# Patient Record
Sex: FEMALE | Race: WHITE | NOT HISPANIC OR LATINO | Employment: FULL TIME | URBAN - METROPOLITAN AREA
[De-identification: names, ages, dates, MRNs, and addresses within clinical notes are randomized per-mention and may not be internally consistent; named-entity substitution may affect disease eponyms.]

---

## 2022-12-29 ENCOUNTER — HOSPITAL ENCOUNTER (EMERGENCY)
Facility: HOSPITAL | Age: 27
Discharge: HOME | End: 2022-12-30
Attending: EMERGENCY MEDICINE
Payer: MEDICAID

## 2022-12-29 DIAGNOSIS — R55 SYNCOPE: ICD-10-CM

## 2022-12-29 DIAGNOSIS — I48.91 ATRIAL FIBRILLATION WITH RVR: Primary | ICD-10-CM

## 2022-12-29 DIAGNOSIS — M25.579 ANKLE PAIN: ICD-10-CM

## 2022-12-29 DIAGNOSIS — M79.673 FOOT PAIN: ICD-10-CM

## 2022-12-29 DIAGNOSIS — S93.409A SPRAIN OF ANKLE, UNSPECIFIED LATERALITY, UNSPECIFIED LIGAMENT, INITIAL ENCOUNTER: ICD-10-CM

## 2022-12-29 DIAGNOSIS — R00.0 TACHYCARDIA: ICD-10-CM

## 2022-12-29 DIAGNOSIS — I48.91 A-FIB: ICD-10-CM

## 2022-12-29 DIAGNOSIS — R55 SYNCOPE, UNSPECIFIED SYNCOPE TYPE: ICD-10-CM

## 2022-12-29 LAB
BASOPHILS # BLD AUTO: 0.05 K/UL (ref 0–0.2)
BASOPHILS NFR BLD: 0.3 % (ref 0–1.9)
BNP SERPL-MCNC: <10 PG/ML (ref 0–99)
DIFFERENTIAL METHOD: ABNORMAL
EOSINOPHIL # BLD AUTO: 0.1 K/UL (ref 0–0.5)
EOSINOPHIL NFR BLD: 0.9 % (ref 0–8)
ERYTHROCYTE [DISTWIDTH] IN BLOOD BY AUTOMATED COUNT: 14 % (ref 11.5–14.5)
HCT VFR BLD AUTO: 41.2 % (ref 37–48.5)
HGB BLD-MCNC: 13.1 G/DL (ref 12–16)
IMM GRANULOCYTES # BLD AUTO: 0.08 K/UL (ref 0–0.04)
IMM GRANULOCYTES NFR BLD AUTO: 0.5 % (ref 0–0.5)
LYMPHOCYTES # BLD AUTO: 4.9 K/UL (ref 1–4.8)
LYMPHOCYTES NFR BLD: 33.7 % (ref 18–48)
MCH RBC QN AUTO: 27.2 PG (ref 27–31)
MCHC RBC AUTO-ENTMCNC: 31.8 G/DL (ref 32–36)
MCV RBC AUTO: 86 FL (ref 82–98)
MONOCYTES # BLD AUTO: 0.8 K/UL (ref 0.3–1)
MONOCYTES NFR BLD: 5.2 % (ref 4–15)
NEUTROPHILS # BLD AUTO: 8.7 K/UL (ref 1.8–7.7)
NEUTROPHILS NFR BLD: 59.4 % (ref 38–73)
NRBC BLD-RTO: 0 /100 WBC
PLATELET # BLD AUTO: 300 K/UL (ref 150–450)
PMV BLD AUTO: 11.8 FL (ref 9.2–12.9)
RBC # BLD AUTO: 4.81 M/UL (ref 4–5.4)
TROPONIN I SERPL DL<=0.01 NG/ML-MCNC: <0.006 NG/ML (ref 0–0.03)
WBC # BLD AUTO: 14.68 K/UL (ref 3.9–12.7)

## 2022-12-29 PROCEDURE — 86803 HEPATITIS C AB TEST: CPT | Performed by: PHYSICIAN ASSISTANT

## 2022-12-29 PROCEDURE — 99285 EMERGENCY DEPT VISIT HI MDM: CPT | Mod: ,,, | Performed by: EMERGENCY MEDICINE

## 2022-12-29 PROCEDURE — 63600175 PHARM REV CODE 636 W HCPCS: Performed by: STUDENT IN AN ORGANIZED HEALTH CARE EDUCATION/TRAINING PROGRAM

## 2022-12-29 PROCEDURE — 93010 EKG 12-LEAD: ICD-10-PCS | Mod: 76,,, | Performed by: INTERNAL MEDICINE

## 2022-12-29 PROCEDURE — 84484 ASSAY OF TROPONIN QUANT: CPT | Performed by: EMERGENCY MEDICINE

## 2022-12-29 PROCEDURE — 87389 HIV-1 AG W/HIV-1&-2 AB AG IA: CPT | Performed by: PHYSICIAN ASSISTANT

## 2022-12-29 PROCEDURE — 96374 THER/PROPH/DIAG INJ IV PUSH: CPT

## 2022-12-29 PROCEDURE — 93005 ELECTROCARDIOGRAM TRACING: CPT

## 2022-12-29 PROCEDURE — 96375 TX/PRO/DX INJ NEW DRUG ADDON: CPT

## 2022-12-29 PROCEDURE — 94761 N-INVAS EAR/PLS OXIMETRY MLT: CPT

## 2022-12-29 PROCEDURE — 83880 ASSAY OF NATRIURETIC PEPTIDE: CPT | Performed by: EMERGENCY MEDICINE

## 2022-12-29 PROCEDURE — 93010 ELECTROCARDIOGRAM REPORT: CPT | Mod: 76,,, | Performed by: INTERNAL MEDICINE

## 2022-12-29 PROCEDURE — 85025 COMPLETE CBC W/AUTO DIFF WBC: CPT | Performed by: EMERGENCY MEDICINE

## 2022-12-29 PROCEDURE — 84443 ASSAY THYROID STIM HORMONE: CPT | Performed by: EMERGENCY MEDICINE

## 2022-12-29 PROCEDURE — 99285 PR EMERGENCY DEPT VISIT,LEVEL V: ICD-10-PCS | Mod: ,,, | Performed by: EMERGENCY MEDICINE

## 2022-12-29 PROCEDURE — 93010 ELECTROCARDIOGRAM REPORT: CPT | Mod: ,,, | Performed by: INTERNAL MEDICINE

## 2022-12-29 PROCEDURE — 99285 EMERGENCY DEPT VISIT HI MDM: CPT | Mod: 25

## 2022-12-29 RX ORDER — ONDANSETRON 2 MG/ML
4 INJECTION INTRAMUSCULAR; INTRAVENOUS
Status: COMPLETED | OUTPATIENT
Start: 2022-12-29 | End: 2022-12-29

## 2022-12-29 RX ORDER — MORPHINE SULFATE 4 MG/ML
4 INJECTION, SOLUTION INTRAMUSCULAR; INTRAVENOUS
Status: COMPLETED | OUTPATIENT
Start: 2022-12-29 | End: 2022-12-29

## 2022-12-29 RX ADMIN — ONDANSETRON 4 MG: 2 INJECTION INTRAMUSCULAR; INTRAVENOUS at 11:12

## 2022-12-29 RX ADMIN — MORPHINE SULFATE 4 MG: 4 INJECTION INTRAVENOUS at 11:12

## 2022-12-29 NOTE — Clinical Note
"Mary Driscoll "Mary Driscoll" Mike was seen and treated in our emergency department on 12/29/2022.  She may return to work on 01/03/2023.       If you have any questions or concerns, please don't hesitate to call.      Stoney Beal MD"

## 2022-12-30 VITALS
SYSTOLIC BLOOD PRESSURE: 105 MMHG | OXYGEN SATURATION: 98 % | HEART RATE: 74 BPM | DIASTOLIC BLOOD PRESSURE: 51 MMHG | RESPIRATION RATE: 15 BRPM | TEMPERATURE: 98 F

## 2022-12-30 LAB
ALBUMIN SERPL BCP-MCNC: 2.9 G/DL (ref 3.5–5.2)
ALP SERPL-CCNC: 81 U/L (ref 55–135)
ALT SERPL W/O P-5'-P-CCNC: 24 U/L (ref 10–44)
ANION GAP SERPL CALC-SCNC: 8 MMOL/L (ref 8–16)
AST SERPL-CCNC: 24 U/L (ref 10–40)
BILIRUB SERPL-MCNC: 0.2 MG/DL (ref 0.1–1)
BUN SERPL-MCNC: 9 MG/DL (ref 6–20)
CALCIUM SERPL-MCNC: 7.9 MG/DL (ref 8.7–10.5)
CHLORIDE SERPL-SCNC: 112 MMOL/L (ref 95–110)
CO2 SERPL-SCNC: 20 MMOL/L (ref 23–29)
CREAT SERPL-MCNC: 0.5 MG/DL (ref 0.5–1.4)
D DIMER PPP IA.FEU-MCNC: 0.52 MG/L FEU
EST. GFR  (NO RACE VARIABLE): >60 ML/MIN/1.73 M^2
GLUCOSE SERPL-MCNC: 87 MG/DL (ref 70–110)
HCV AB SERPL QL IA: NORMAL
HIV 1+2 AB+HIV1 P24 AG SERPL QL IA: NORMAL
MAGNESIUM SERPL-MCNC: 1.8 MG/DL (ref 1.6–2.6)
POTASSIUM SERPL-SCNC: 3.7 MMOL/L (ref 3.5–5.1)
PROT SERPL-MCNC: 6.6 G/DL (ref 6–8.4)
SODIUM SERPL-SCNC: 140 MMOL/L (ref 136–145)
TROPONIN I SERPL DL<=0.01 NG/ML-MCNC: <0.006 NG/ML (ref 0–0.03)
TSH SERPL DL<=0.005 MIU/L-ACNC: 1.5 UIU/ML (ref 0.4–4)

## 2022-12-30 PROCEDURE — 84484 ASSAY OF TROPONIN QUANT: CPT | Performed by: STUDENT IN AN ORGANIZED HEALTH CARE EDUCATION/TRAINING PROGRAM

## 2022-12-30 PROCEDURE — 83735 ASSAY OF MAGNESIUM: CPT | Performed by: EMERGENCY MEDICINE

## 2022-12-30 PROCEDURE — 80053 COMPREHEN METABOLIC PANEL: CPT | Performed by: EMERGENCY MEDICINE

## 2022-12-30 PROCEDURE — 25500020 PHARM REV CODE 255: Performed by: EMERGENCY MEDICINE

## 2022-12-30 PROCEDURE — 85379 FIBRIN DEGRADATION QUANT: CPT | Performed by: STUDENT IN AN ORGANIZED HEALTH CARE EDUCATION/TRAINING PROGRAM

## 2022-12-30 RX ADMIN — IOHEXOL 100 ML: 350 INJECTION, SOLUTION INTRAVENOUS at 01:12

## 2022-12-30 NOTE — DISCHARGE INSTRUCTIONS
We could not rule out a blood clot going to your lungs called a pulmonary embolism. If you develop another fainting episode or have worse chest pain, palpatations or trouble breathing, return to the ER.    Diagnosis: Atrial fibrillation with RVR, ankle sprain    Tests today showed:   Labs Reviewed   CBC W/ AUTO DIFFERENTIAL - Abnormal; Notable for the following components:       Result Value    WBC 14.68 (*)     MCHC 31.8 (*)     Gran # (ANC) 8.7 (*)     Immature Grans (Abs) 0.08 (*)     Lymph # 4.9 (*)     All other components within normal limits   COMPREHENSIVE METABOLIC PANEL - Abnormal; Notable for the following components:    Chloride 112 (*)     CO2 20 (*)     Calcium 7.9 (*)     Albumin 2.9 (*)     All other components within normal limits   D DIMER, QUANTITATIVE - Abnormal; Notable for the following components:    D-Dimer 0.52 (*)     All other components within normal limits   HIV 1 / 2 ANTIBODY    Narrative:     Release to patient->Immediate   HEPATITIS C ANTIBODY    Narrative:     Release to patient->Immediate   TROPONIN I   B-TYPE NATRIURETIC PEPTIDE   TSH   MAGNESIUM   TROPONIN I     US Lower Extremity Veins Bilateral   Final Result      No evidence of deep venous thrombosis in either lower extremity.         Electronically signed by: Shola Pond MD   Date:    12/30/2022   Time:    05:10      CTA Chest Non-Coronary (PE Studies)   Final Result      1. Limited evaluation for pulmonary embolus due to suboptimal opacification the pulmonary arteries as discussed above.  No definite evidence of central pulmonary embolus or compelling evidence of pulmonary thromboembolism to the proximal segmental levels or pulmonary infarction. Pulmonary embolus distal to the proximal segmental levels cannot be excluded. Correlation with d-dimer and lower extremity venous Doppler ultrasound as clinically warranted.   2. Minimal dependent bibasilar atelectasis.   3. Right upper lobe 4 mm pulmonary nodule.  Clinical  considerations will determine the need for and timing of any surveillance imaging as Fleischner society criteria do not apply to a patient under 35 years of age.         Electronically signed by: Lyssa Bal MD   Date:    12/30/2022   Time:    02:00      CT Cervical Spine Without Contrast   Final Result      1. No CT evidence of acute intracranial abnormality. Clinical correlation and further evaluation as warranted.   2. Significant paranasal sinus opacification as discussed above. Correlation for sinusitis advised.   3. Opacification of inferior left mastoid air cells, a nonspecific finding which could relate to eustachian tube dysfunction or mastoiditis noting there is no evidence of osseous destruction/coalescence. Clinical correlation advised.   4. No CT evidence of acute cervical spine fracture or traumatic subluxation. Clinical correlation and further evaluation as warranted.         Electronically signed by: Lyssa Bal MD   Date:    12/30/2022   Time:    01:43      CT Head Without Contrast   Final Result      1. No CT evidence of acute intracranial abnormality. Clinical correlation and further evaluation as warranted.   2. Significant paranasal sinus opacification as discussed above.  Correlation for sinusitis advised.   3. Opacification of inferior left mastoid air cells, a nonspecific finding which could relate to eustachian tube dysfunction or mastoiditis noting there is no evidence of osseous destruction/coalescence.  Clinical correlation advised.   4. No CT evidence of acute cervical spine fracture or traumatic subluxation.  Clinical correlation and further evaluation as warranted.         Electronically signed by: Lyssa Bal MD   Date:    12/30/2022   Time:    01:42      X-Ray Chest 1 View   Final Result      No acute intrathoracic abnormality appreciated on this single radiographic view of the chest.         Electronically signed by: Lyssa Bal MD   Date:    12/29/2022   Time:    23:09       X-Ray Foot Complete Right   Final Result      No acute fracture.         Electronically signed by: Shola Pond MD   Date:    12/29/2022   Time:    23:16      X-Ray Ankle Complete Right   Final Result      No acute fracture.      Mild soft tissue swelling about the ankle.         Electronically signed by: Shola Pond MD   Date:    12/29/2022   Time:    23:07          Treatments you had today:   Medications   morphine injection 4 mg (4 mg Intravenous Given 12/29/22 2341)   ondansetron injection 4 mg (4 mg Intravenous Given 12/29/22 2341)   iohexoL (OMNIPAQUE 350) injection 100 mL (100 mLs Intravenous Given 12/30/22 0129)       Follow-Up Plan:  - Follow-up with primary care doctor within 3 - 5 days  - Additional testing and/or evaluation as directed by your primary doctor    Return to the Emergency Department for symptoms including but not limited to: worsening symptoms, shortness of breath or chest pain, vomiting with inability to hold down fluids, fevers greater than 100.4°F, passing out/fainting/unconsciousness, or other concerning symptoms.

## 2022-12-30 NOTE — ED PROVIDER NOTES
Encounter Date: 12/29/2022       History     Chief Complaint   Patient presents with    Atrial Fibrillation     Pt arrives by EMS c/o right ankle pain right sided head pain after fall. + LOC. Upon EMS arrival. Patient was in afib w/ rvr. No heart hx.     27-year-old female with no significant past medical later presenting to the ED after fall.  She had no LOC episode after the fall when she was trying to get up.  She was caught by her fiance during this LOC episode not fall again.  She did have head trauma.  No vomiting.  Initially had blurry vision, though denies any vision changes now, no weakness in arms or legs.  Also complains of severe right ankle pain.  Limited range of motion of right ankle.  Endorses numbness of distal right lower extremity.  When EMS arrived, patient was tachycardic into the 190s, was in AFib.  Got three rounds of diltiazem with conversion to sinus rhythm.  Endorses mild chest soreness, mild shortness of breath that are intermittent.      Review of patient's allergies indicates:  No Known Allergies  Past Medical History:   Diagnosis Date    Anxiety disorder, unspecified     History of mononucleosis     Hypoglycemia     Migraine headache     Posttraumatic stress disorder     Vitamin deficiency      Past Surgical History:   Procedure Laterality Date    ADENOIDECTOMY      TONSILLECTOMY      TYMPANOSTOMY TUBE PLACEMENT       Family History   Problem Relation Age of Onset    Migraines Mother     Sjogren's syndrome Father      Social History     Tobacco Use    Smoking status: Every Day     Packs/day: 0.50     Types: Cigarettes    Smokeless tobacco: Never   Substance Use Topics    Alcohol use: No    Drug use: No     Review of Systems   Constitutional:  Negative for fever.   HENT:  Negative for sore throat.    Respiratory:  Positive for chest tightness and shortness of breath.    Cardiovascular:  Negative for chest pain.   Gastrointestinal:  Negative for abdominal pain, nausea and vomiting.    Genitourinary:  Negative for dysuria.   Musculoskeletal:  Negative for back pain.   Skin:  Negative for rash.   Neurological:  Positive for syncope. Negative for dizziness, weakness, light-headedness and headaches.   Hematological:  Does not bruise/bleed easily.     Physical Exam     Initial Vitals   BP Pulse Resp Temp SpO2   12/29/22 1944 12/29/22 1944 12/29/22 1944 12/29/22 2304 12/29/22 1944   (!) 161/122 (!) 144 20 98.4 °F (36.9 °C) 98 %      MAP       --                Physical Exam    Nursing note and vitals reviewed.  Constitutional: She appears well-developed and well-nourished. She is not diaphoretic. No distress.   HENT:   Head: Normocephalic and atraumatic.   Eyes: Conjunctivae and EOM are normal. Right eye exhibits no discharge. Left eye exhibits no discharge. No scleral icterus.   Neck:   Normal range of motion.  Cardiovascular:  Regular rhythm and intact distal pulses.     Exam reveals no gallop and no friction rub.       No murmur heard.  Tachycardic   Pulmonary/Chest: No respiratory distress. She has no wheezes. She has no rhonchi. She has no rales. She exhibits no tenderness.   Abdominal: Abdomen is soft. She exhibits no distension and no mass. There is no abdominal tenderness. There is no rebound and no guarding.   Musculoskeletal:      Cervical back: Normal range of motion.      Comments: Pain, tenderness to palpation of right ankle, proximal foot.  Able to move toes.     Neurological: She is alert and oriented to person, place, and time. She has normal strength. No cranial nerve deficit.   Endorses mildly decreased sensation of right distal leg.   Skin: Skin is warm and dry. No erythema. No pallor.       ED Course   Procedures  Labs Reviewed   CBC W/ AUTO DIFFERENTIAL - Abnormal; Notable for the following components:       Result Value    WBC 14.68 (*)     MCHC 31.8 (*)     Gran # (ANC) 8.7 (*)     Immature Grans (Abs) 0.08 (*)     Lymph # 4.9 (*)     All other components within normal limits    COMPREHENSIVE METABOLIC PANEL - Abnormal; Notable for the following components:    Chloride 112 (*)     CO2 20 (*)     Calcium 7.9 (*)     Albumin 2.9 (*)     All other components within normal limits   D DIMER, QUANTITATIVE - Abnormal; Notable for the following components:    D-Dimer 0.52 (*)     All other components within normal limits   HIV 1 / 2 ANTIBODY    Narrative:     Release to patient->Immediate   HEPATITIS C ANTIBODY    Narrative:     Release to patient->Immediate   TROPONIN I   B-TYPE NATRIURETIC PEPTIDE   TSH   MAGNESIUM   TROPONIN I     EKG Readings: (Independently Interpreted)   Sinus tachycardia, rate of 108. No ST elevations or depressions concerning for ischemia.  No STEMI per my independent interpretation     ECG Results              Repeat EKG 12-lead (Final result)  Result time 12/30/22 09:30:42      Final result by Interface, [facility] (12/30/22 09:30:42)                   Narrative:    Test Reason : R00.0,    Vent. Rate : 108 BPM     Atrial Rate : 108 BPM     P-R Int : 152 ms          QRS Dur : 068 ms      QT Int : 300 ms       P-R-T Axes : 053 028 045 degrees     QTc Int : 402 ms    Sinus tachycardia  Otherwise normal ECG  When compared with ECG of 29-DEC-2022 19:51,  Previous ECG has undetermined rhythm, needs review  Confirmed by Verónica Ingram MD ([id number]) on 12/30/2022 9:30:31 AM    Referred By: [email or web address]   SELF           Confirmed By:Verónica Ingram MD                                     EKG 12-lead (Final result)  Result time 12/30/22 09:34:32      Final result by Interface, Lab In [facility] (12/30/22 09:34:32)                   Narrative:    Test Reason : I48.91,    Vent. Rate : 147 BPM     Atrial Rate : 096 BPM     P-R Int : 000 ms          QRS Dur : 074 ms      QT Int : 292 ms       P-R-T Axes : 000 028 051 degrees     QTc Int : 456 ms    Atrial fibrillation  When compared with ECG of 11-SEP-2022 20:30,  Atrial fibrillation has replaced Normal sinus rhythm  Confirmed by  Verónica Ingram MD ([id number]) on 12/30/2022 9:34:24 AM    Referred By: [email or web address]   SELF           Confirmed By:Verónica Ingram MD                                  Imaging Results              US Lower Extremity Veins Bilateral (Final result)  Result time 12/30/22 05:10:57      Final result by Shola Pond MD (12/30/22 05:10:57)                   Impression:      No evidence of deep venous thrombosis in either lower extremity.      Electronically signed by: Shola Pond MD  Date:    12/30/2022  Time:    05:10               Narrative:    EXAMINATION:  US LOWER EXTREMITY VEINS BILATERAL    CLINICAL HISTORY:  Syncope and collapse    TECHNIQUE:  Duplex and color flow Doppler and dynamic compression was performed of the bilateral lower extremity veins was performed.    COMPARISON:  None    FINDINGS:  Right thigh veins: The common femoral, femoral, popliteal, upper greater saphenous, and deep femoral veins are patent and free of thrombus. The veins are normally compressible and have normal phasic flow and augmentation response.    Right calf veins: The visualized calf veins are patent.    Left thigh veins: The common femoral, femoral, popliteal, upper greater saphenous, and deep femoral veins are patent and free of thrombus. The veins are normally compressible and have normal phasic flow and augmentation response.    Left calf veins: The visualized calf veins are patent.    Miscellaneous: None                                       CTA Chest Non-Coronary (PE Studies) (Final result)  Result time 12/30/22 02:00:22      Final result by Lyssa Bal MD (12/30/22 02:00:22)                   Impression:      1. Limited evaluation for pulmonary embolus due to suboptimal opacification the pulmonary arteries as discussed above.  No definite evidence of central pulmonary embolus or compelling evidence of pulmonary thromboembolism to the proximal segmental levels or pulmonary infarction. Pulmonary embolus distal to the  proximal segmental levels cannot be excluded. Correlation with d-dimer and lower extremity venous Doppler ultrasound as clinically warranted.  2. Minimal dependent bibasilar atelectasis.  3. Right upper lobe 4 mm pulmonary nodule.  Clinical considerations will determine the need for and timing of any surveillance imaging as Fleischner society criteria do not apply to a patient under 35 years of age.      Electronically signed by: Lyssa Bal MD  Date:    12/30/2022  Time:    02:00               Narrative:    EXAMINATION:  CTA CHEST NON CORONARY (PE STUDIES)    CLINICAL HISTORY:  Pulmonary embolism (PE) suspected, high prob;    TECHNIQUE:  Low dose axial images, sagittal and coronal reformations were obtained from the thoracic inlet to the lung bases following the IV administration of 100 mL of Omnipaque 350.  Contrast timing was optimized to evaluate the pulmonary arteries.  MIP images were performed.    COMPARISON:  None    FINDINGS:  The thoracic aorta maintains normal caliber, contour, and course without significant atherosclerotic calcification within its course.  There is no evidence of aneurysmal dilation or dissection.  Incidental note is made of a direct origin of the left vertebral artery from the aortic arch.  The heart is not enlarged and there is no evidence of pericardial effusion.The esophagus maintains a normal course and caliber.There is no axillary or mediastinal adenopathy.  Heterogeneous soft tissue attenuation in the anterior mediastinum presumably reflects residual thymic tissue.  Hilar contours are within normal limits.    The trachea is midline and the proximal airways are patent.  There is no focal consolidation, pleural fluid or pneumothorax.  There is minimal dependent bibasilar atelectasis.  There is a right upper lobe 4 mm pulmonary nodule.    Please note there is limited evaluation for pulmonary embolus due to suboptimal opacification of the pulmonary arteries.  Allowing for this and  artifact from dense contrast bolus in the SVC, no definite large central filling defect identified to suggest central pulmonary embolus or compelling evidence of central embolus to the proximal segmental levels.  Evaluation for additional segmental and subsegmental PE is precluded.  Correlation with d-dimer and lower extremity venous Doppler ultrasound as clinically warranted.    Visualized structures of the upper abdomen demonstrate no acute abnormalities.                                       CT Cervical Spine Without Contrast (Final result)  Result time 12/30/22 01:43:42      Final result by Lyssa Bal MD (12/30/22 01:43:42)                   Impression:      1. No CT evidence of acute intracranial abnormality. Clinical correlation and further evaluation as warranted.  2. Significant paranasal sinus opacification as discussed above. Correlation for sinusitis advised.  3. Opacification of inferior left mastoid air cells, a nonspecific finding which could relate to eustachian tube dysfunction or mastoiditis noting there is no evidence of osseous destruction/coalescence. Clinical correlation advised.  4. No CT evidence of acute cervical spine fracture or traumatic subluxation. Clinical correlation and further evaluation as warranted.      Electronically signed by: Lyssa Bal MD  Date:    12/30/2022  Time:    01:43               Narrative:    EXAMINATION:  CT CERVICAL SPINE WITHOUT CONTRAST    CLINICAL HISTORY:  Polytrauma, blunt;    TECHNIQUE:  Low dose axial images were obtained through the head and cervical spine.  Coronal and sagittal reformations were also performed. Contrast was not administered.    COMPARISON:  Head CT 10/23/2014, cervical spine radiographs 03/09/2014    FINDINGS:  Head CT:    There is no acute intracranial hemorrhage, hydrocephalus, midline shift or mass effect. Gray-white matter differentiation appears maintained. The basal cisterns are patent. There is mucosal thickening of the  frontal sinuses. There is mucosal thickening and opacification of the frontoethmoidal recesses and ethmoid sinuses. There is significant mucosal thickening of the bilateral maxillary sinuses. Additional significant mucosal thickening of the right sphenoid sinus and trace mucosal thickening of the left sphenoid sinus. There is opacification of inferior left mastoid air cells. Incidental note is made of pneumatization of the right petrous apex. The visualized bones of the calvarium demonstrate no acute osseous abnormality.    Cervical Spine CT:    There is straightening of normal cervical lordosis which can be secondary to muscle spasm and/or positioning. Otherwise, cervical vertebral body alignment is within normal limits. Vertebral body heights are within normal limits. Intervertebral disc heights appear maintained. The facet joints articulate appropriately. No significant prevertebral soft tissue swelling. Slight prominence of posterior nasopharyngeal soft tissues, nonspecific and possibly relating to adenoidal hypertrophy in a patient of this chronologic age. The visualized soft tissue structures demonstrate normal and slightly prominent cervical chain lymph nodes. Visualized lung apices are free of pleural fluid or focal consolidation.                                       CT Head Without Contrast (Final result)  Result time 12/30/22 01:42:51      Final result by Lyssa Bal MD (12/30/22 01:42:51)                   Impression:      1. No CT evidence of acute intracranial abnormality. Clinical correlation and further evaluation as warranted.  2. Significant paranasal sinus opacification as discussed above.  Correlation for sinusitis advised.  3. Opacification of inferior left mastoid air cells, a nonspecific finding which could relate to eustachian tube dysfunction or mastoiditis noting there is no evidence of osseous destruction/coalescence.  Clinical correlation advised.  4. No CT evidence of acute cervical  spine fracture or traumatic subluxation.  Clinical correlation and further evaluation as warranted.      Electronically signed by: Lyssa Bal MD  Date:    12/30/2022  Time:    01:42               Narrative:    EXAMINATION:  CT HEAD WITHOUT CONTRAST    CLINICAL HISTORY:  Head trauma, moderate-severe;    TECHNIQUE:  Low dose axial images were obtained through the head and cervical spine.  Coronal and sagittal reformations were also performed. Contrast was not administered.    COMPARISON:  Head CT 10/23/2014, cervical spine radiographs 03/09/2014    FINDINGS:  Head CT:    There is no acute intracranial hemorrhage, hydrocephalus, midline shift or mass effect. Gray-white matter differentiation appears maintained. The basal cisterns are patent. There is mucosal thickening of the frontal sinuses.  There is mucosal thickening and opacification of the frontoethmoidal recesses and ethmoid sinuses.  There is significant mucosal thickening of the bilateral maxillary sinuses.  Additional significant mucosal thickening of the right sphenoid sinus and trace mucosal thickening of the left sphenoid sinus.  There is opacification of inferior left mastoid air cells.  Incidental note is made of pneumatization of the right petrous apex.  The visualized bones of the calvarium demonstrate no acute osseous abnormality.    Cervical Spine CT:    There is straightening of normal cervical lordosis which can be secondary to muscle spasm and/or positioning.  Otherwise, cervical vertebral body alignment is within normal limits.  Vertebral body heights are within normal limits.  Intervertebral disc heights appear maintained.  The facet joints articulate appropriately.  No significant prevertebral soft tissue swelling.  Slight prominence of posterior nasopharyngeal soft tissues, nonspecific and possibly relating to adenoidal hypertrophy in a patient of this chronologic age.  The visualized soft tissue structures demonstrate normal and slightly  prominent cervical chain lymph nodes.  Visualized lung apices are free of pleural fluid or focal consolidation.                                       X-Ray Chest 1 View (Final result)  Result time 12/29/22 23:09:04      Final result by Lyssa Bal MD (12/29/22 23:09:04)                   Impression:      No acute intrathoracic abnormality appreciated on this single radiographic view of the chest.      Electronically signed by: Lyssa Bal MD  Date:    12/29/2022  Time:    23:09               Narrative:    EXAMINATION:  XR CHEST 1 VIEW    CLINICAL HISTORY:  Unspecified atrial fibrillation    TECHNIQUE:  Single frontal view of the chest was performed.    COMPARISON:  03/09/2014    FINDINGS:  Cardiac monitoring leads overlie the chest.  The cardiomediastinal silhouette is within normal limits of size and configuration.  Mediastinal structures are midline.  The lungs are symmetrically expanded without evidence of confluent airspace consolidation.  No substantial volume of pleural fluid or pneumothorax identified.  Visualized osseous structures are intact.                                       X-Ray Foot Complete Right (Final result)  Result time 12/29/22 23:16:14      Final result by Shola Pond MD (12/29/22 23:16:14)                   Impression:      No acute fracture.      Electronically signed by: Shola Pond MD  Date:    12/29/2022  Time:    23:16               Narrative:    EXAMINATION:  XR FOOT COMPLETE 3 VIEW RIGHT    CLINICAL HISTORY:  . Pain in unspecified foot    TECHNIQUE:  AP, lateral, and oblique views of the right foot were performed.    COMPARISON:  None.    FINDINGS:  No acute fracture or dislocation.  Bipartite sesamoid bones.  Lisfranc articulation is congruent.  Cartilage spaces are maintained.  Soft tissues are unremarkable.                                       X-Ray Ankle Complete Right (Final result)  Result time 12/29/22 23:07:01      Final result by Shola Pond MD  (12/29/22 23:07:01)                   Impression:      No acute fracture.    Mild soft tissue swelling about the ankle.      Electronically signed by: Shola Pond MD  Date:    12/29/2022  Time:    23:07               Narrative:    EXAMINATION:  XR ANKLE COMPLETE 3 VIEW RIGHT    CLINICAL HISTORY:  Pain in unspecified ankle and joints of unspecified foot    TECHNIQUE:  AP, lateral, and oblique images of the right ankle were performed.    COMPARISON:  None    FINDINGS:  No fracture or dislocation.  Ankle mortise is symmetric.  Talar dome is maintained.  Mild soft tissue swelling about the ankle.                                    X-Rays:   Independently Interpreted Readings:   Other Readings:  No pneumonia, pneumothorax, or pleural effusion noted on CXR    No fractures noted on foot, ankle x-ray  Medications   morphine injection 4 mg (4 mg Intravenous Given 12/29/22 2341)   ondansetron injection 4 mg (4 mg Intravenous Given 12/29/22 2341)   iohexoL (OMNIPAQUE 350) injection 100 mL (100 mLs Intravenous Given 12/30/22 0129)     Medical Decision Making:   History:   Old Medical Records: I decided to obtain old medical records.  Initial Assessment:   27-year-old female presenting to the ED with fall, syncope.  Found to be in AFib with RVR, no previous history.  Got three rounds of diltiazem, converted to sinus tachycardia with EMS.    Differential includes was not limited to intracranial hemorrhage, C-spine fracture, PE, electrolyte abnormalities, ACS    X-ray right foot in the ankle showed no concern for fractures.  Suspect ankle sprain.  CT head showed no concern for intracranial hemorrhage.  CT C-spine showed no concern for C-spine fracture.  Due to patient being in AFib with RVR, with no previous history and age, did CT PE study to assess for possible pulmonary embolism causing patient's atrial fibrillation.  CT PE study practically nondiagnostic, though were able to rule out saddle PE.  Ultrasound bilateral lower  extremity showed no concern for DVT.  D-dimer mildly elevated at 0.52.  Troponin negative x2.  Had lengthy conversation with patient about presumptive PE diagnosis in close follow-up with PCP and Cardiology and restarted on anticoagulation vs have close follow-up with PCP and Cardiology, not be on anticoagulation, have strict return precautions for possible recurrent syncope, worsening chest pain, shortness of breath.  Recommended getting a repeat CT PE study in the near future with PCP.    PCP follow-up within 2-3 days was recommended.    After taking into careful account the patient's history, physical exam findings, as well as empirical and objective data obtained throughout ED workup, I feel no emergent medical condition has been identified. No further evaluation or admission was felt to be required, and the patient is stable for discharge from the ED. The patient and any additional family present were updated with test results, overall clinical impression, and recommended further plan of care, including discharge instructions as provided and outpatient follow-up for continued evaluation and management as needed. All questions were answered. The patient expressed understanding and agreed with current plan for discharge and follow-up plan of care. Strict ED return precautions were provided, including return/worsening of current symptoms or any other concerns.    Independently Interpreted Test(s):   I have ordered and independently interpreted X-rays - see prior notes.  I have ordered and independently interpreted EKG Reading(s) - see prior notes  Clinical Tests:   Lab Tests: Ordered and Reviewed  Radiological Study: Ordered and Reviewed  Medical Tests: Ordered and Reviewed          Attending Attestation:   Physician Attestation Statement for Resident:  As the supervising MD   Physician Attestation Statement: I have personally seen and examined this patient.   I agree with the above history.  -:   As the  supervising MD I agree with the above PE.     As the supervising MD I agree with the above treatment, course, plan, and disposition.    I have reviewed and agree with the residents interpretation of the following: lab data, x-rays, EKG and CT scans.  I have reviewed the following: old records at this facility.                           Clinical Impression:   Final diagnoses:  [I48.91] A-fib  [R00.0] Tachycardia  [M25.579] Ankle pain  [M79.673] Foot pain  [I48.91] Atrial fibrillation with RVR (Primary)  [R55] Syncope, unspecified syncope type  [R55] Syncope  [S93.409A] Sprain of ankle, unspecified laterality, unspecified ligament, initial encounter        ED Disposition Condition    Discharge Stable          ED Prescriptions    None       Follow-up Information       Follow up With Specialties Details Why Contact Info Additional Information    Symone Prince MD Family Medicine Schedule an appointment as soon as possible for a visit   [id number]   [place] [address]  [phone]       [facility] - Cardiology - [facility] Cardiology Schedule an appointment as soon as possible for a visit   [address] [facility] [id number] [phone] Cardiology Services Clinics - 3rd floor    [facility] - Orthopedics [facility] Orthopedics Schedule an appointment as soon as possible for a visit   [address] [facility], 5th Floor  [facility] [id number] [phone] [facility] - Main Building, 5th Floor Please park in [facility] and take Atrium elevator             Stoney Beal MD  Resident  12/30/22 [phone]       Cristina Wright MD  01/02/23 [id number]

## 2022-12-30 NOTE — ED TRIAGE NOTES
Pt arrives by EMS c/o right ankle pain right sided head pain after fall. + LOC. Upon EMS arrival. Patient was in afib w/ rvr. No heart hx. Remembers falling, hit head on the wall, and stood back up, then passed out.

## 2022-12-30 NOTE — ED NOTES
Patient identifiers verified and correct for Mary Mckeon   LOC: The patient is awake, alert and aware of environment with an appropriate affect, the patient is oriented x 3 and speaking appropriately.   APPEARANCE: Patient appears comfortable and in no acute distress, patient is clean and well groomed.  SKIN: The skin is warm and dry, color consistent with ethnicity, patient has normal skin turgor and moist mucus membranes, skin intact  MUSCULOSKELETAL: Patient moving all extremities spontaneously, pt's right ankle in splint. Swelling noted. Pt c/o right ankle pain.  RESPIRATORY: Airway is open and patent, respirations are spontaneous, patient has a normal effort and rate, no accessory muscle use noted, pt placed on continuous pulse ox with O2 sats noted at 98% on room air.  CARDIAC: Pt placed on cardiac monitor. Patient has a tachy rate, no edema noted, capillary refill < 3 seconds.   GASTRO: Soft and non tender to palpation, no distention noted, normoactive bowel sounds present in all four quadrants. Pt states bowel movements have been regular.  : Pt denies any pain or frequency with urination.  NEURO: Pt opens eyes spontaneously, behavior appropriate to situation, follows commands, facial expression symmetrical, bilateral hand grasp equal and even, purposeful motor response noted, normal sensation in all extremities when touched with a finger.

## 2024-07-12 ENCOUNTER — HOSPITAL ENCOUNTER (EMERGENCY)
Facility: HOSPITAL | Age: 29
Discharge: HOME | End: 2024-07-12
Attending: SURGERY

## 2024-07-12 VITALS
WEIGHT: 178.81 LBS | HEART RATE: 84 BPM | BODY MASS INDEX: 36.05 KG/M2 | DIASTOLIC BLOOD PRESSURE: 62 MMHG | SYSTOLIC BLOOD PRESSURE: 117 MMHG | HEIGHT: 59 IN | OXYGEN SATURATION: 100 % | TEMPERATURE: 99 F | RESPIRATION RATE: 16 BRPM

## 2024-07-12 DIAGNOSIS — R00.2 PALPITATIONS: ICD-10-CM

## 2024-07-12 DIAGNOSIS — R07.89 ATYPICAL CHEST PAIN: Primary | ICD-10-CM

## 2024-07-12 LAB
ALBUMIN SERPL BCP-MCNC: 3.7 G/DL (ref 3.5–5.2)
ALP SERPL-CCNC: 85 U/L (ref 55–135)
ALT SERPL W/O P-5'-P-CCNC: 28 U/L (ref 10–44)
AMPHET+METHAMPHET UR QL: NEGATIVE
ANION GAP SERPL CALC-SCNC: 13 MMOL/L (ref 8–16)
AST SERPL-CCNC: 20 U/L (ref 10–40)
B-HCG UR QL: NEGATIVE
BACTERIA #/AREA URNS HPF: ABNORMAL /HPF
BARBITURATES UR QL SCN>200 NG/ML: NEGATIVE
BASOPHILS # BLD AUTO: 0.03 K/UL (ref 0–0.2)
BASOPHILS NFR BLD: 0.4 % (ref 0–1.9)
BENZODIAZ UR QL SCN>200 NG/ML: NEGATIVE
BILIRUB SERPL-MCNC: 0.1 MG/DL (ref 0.1–1)
BILIRUB UR QL STRIP: NEGATIVE
BNP SERPL-MCNC: <10 PG/ML (ref 0–99)
BUN SERPL-MCNC: 11 MG/DL (ref 6–20)
BZE UR QL SCN: NEGATIVE
CALCIUM SERPL-MCNC: 9.2 MG/DL (ref 8.7–10.5)
CANNABINOIDS UR QL SCN: NEGATIVE
CHLORIDE SERPL-SCNC: 103 MMOL/L (ref 95–110)
CK SERPL-CCNC: 47 U/L (ref 20–180)
CLARITY UR: CLEAR
CO2 SERPL-SCNC: 25 MMOL/L (ref 23–29)
COLOR UR: YELLOW
CREAT SERPL-MCNC: 0.8 MG/DL (ref 0.5–1.4)
CREAT UR-MCNC: 148.1 MG/DL (ref 15–325)
D DIMER PPP IA.FEU-MCNC: 0.31 MG/L FEU
DIFFERENTIAL METHOD BLD: ABNORMAL
EOSINOPHIL # BLD AUTO: 0.1 K/UL (ref 0–0.5)
EOSINOPHIL NFR BLD: 1.7 % (ref 0–8)
ERYTHROCYTE [DISTWIDTH] IN BLOOD BY AUTOMATED COUNT: 12.8 % (ref 11.5–14.5)
EST. GFR  (NO RACE VARIABLE): >60 ML/MIN/1.73 M^2
GLUCOSE SERPL-MCNC: 105 MG/DL (ref 70–110)
GLUCOSE UR QL STRIP: NEGATIVE
HCT VFR BLD AUTO: 39.1 % (ref 37–48.5)
HGB BLD-MCNC: 12.8 G/DL (ref 12–16)
HGB UR QL STRIP: ABNORMAL
IMM GRANULOCYTES # BLD AUTO: 0.01 K/UL (ref 0–0.04)
IMM GRANULOCYTES NFR BLD AUTO: 0.1 % (ref 0–0.5)
KETONES UR QL STRIP: ABNORMAL
LEUKOCYTE ESTERASE UR QL STRIP: NEGATIVE
LYMPHOCYTES # BLD AUTO: 3.8 K/UL (ref 1–4.8)
LYMPHOCYTES NFR BLD: 50.1 % (ref 18–48)
MAGNESIUM SERPL-MCNC: 2.2 MG/DL (ref 1.6–2.6)
MCH RBC QN AUTO: 27.4 PG (ref 27–31)
MCHC RBC AUTO-ENTMCNC: 32.7 G/DL (ref 32–36)
MCV RBC AUTO: 84 FL (ref 82–98)
METHADONE UR QL SCN>300 NG/ML: NEGATIVE
MICROSCOPIC COMMENT: ABNORMAL
MONOCYTES # BLD AUTO: 0.6 K/UL (ref 0.3–1)
MONOCYTES NFR BLD: 7.3 % (ref 4–15)
NEUTROPHILS # BLD AUTO: 3.1 K/UL (ref 1.8–7.7)
NEUTROPHILS NFR BLD: 40.4 % (ref 38–73)
NITRITE UR QL STRIP: NEGATIVE
NRBC BLD-RTO: 0 /100 WBC
OPIATES UR QL SCN: NEGATIVE
PCP UR QL SCN>25 NG/ML: NEGATIVE
PH UR STRIP: 6 [PH] (ref 5–8)
PHOSPHATE SERPL-MCNC: 3.1 MG/DL (ref 2.7–4.5)
PLATELET # BLD AUTO: 276 K/UL (ref 150–450)
PMV BLD AUTO: 10 FL (ref 9.2–12.9)
POTASSIUM SERPL-SCNC: 3.7 MMOL/L (ref 3.5–5.1)
PROT SERPL-MCNC: 7.6 G/DL (ref 6–8.4)
PROT UR QL STRIP: NEGATIVE
RBC # BLD AUTO: 4.68 M/UL (ref 4–5.4)
RBC #/AREA URNS HPF: 3 /HPF (ref 0–4)
SODIUM SERPL-SCNC: 141 MMOL/L (ref 136–145)
SP GR UR STRIP: 1.02 (ref 1–1.03)
SQUAMOUS #/AREA URNS HPF: 25 /HPF
TOXICOLOGY INFORMATION: NORMAL
TROPONIN I SERPL DL<=0.01 NG/ML-MCNC: <0.006 NG/ML (ref 0–0.03)
TSH SERPL DL<=0.005 MIU/L-ACNC: 1.45 UIU/ML (ref 0.4–4)
URN SPEC COLLECT METH UR: ABNORMAL
UROBILINOGEN UR STRIP-ACNC: NEGATIVE EU/DL
WBC # BLD AUTO: 7.57 K/UL (ref 3.9–12.7)
YEAST URNS QL MICRO: ABNORMAL

## 2024-07-12 PROCEDURE — 85379 FIBRIN DEGRADATION QUANT: CPT | Performed by: SURGERY

## 2024-07-12 PROCEDURE — 81025 URINE PREGNANCY TEST: CPT | Performed by: SURGERY

## 2024-07-12 PROCEDURE — 84484 ASSAY OF TROPONIN QUANT: CPT | Performed by: SURGERY

## 2024-07-12 PROCEDURE — 84100 ASSAY OF PHOSPHORUS: CPT | Performed by: SURGERY

## 2024-07-12 PROCEDURE — 83735 ASSAY OF MAGNESIUM: CPT | Performed by: SURGERY

## 2024-07-12 PROCEDURE — 96374 THER/PROPH/DIAG INJ IV PUSH: CPT

## 2024-07-12 PROCEDURE — 81000 URINALYSIS NONAUTO W/SCOPE: CPT | Mod: 59 | Performed by: SURGERY

## 2024-07-12 PROCEDURE — 99285 EMERGENCY DEPT VISIT HI MDM: CPT | Mod: 25

## 2024-07-12 PROCEDURE — 99900035 HC TECH TIME PER 15 MIN (STAT)

## 2024-07-12 PROCEDURE — 80053 COMPREHEN METABOLIC PANEL: CPT | Performed by: SURGERY

## 2024-07-12 PROCEDURE — 85025 COMPLETE CBC W/AUTO DIFF WBC: CPT | Performed by: SURGERY

## 2024-07-12 PROCEDURE — 25000003 PHARM REV CODE 250: Performed by: SURGERY

## 2024-07-12 PROCEDURE — 93010 ELECTROCARDIOGRAM REPORT: CPT | Mod: ,,, | Performed by: INTERNAL MEDICINE

## 2024-07-12 PROCEDURE — 93005 ELECTROCARDIOGRAM TRACING: CPT

## 2024-07-12 PROCEDURE — 84443 ASSAY THYROID STIM HORMONE: CPT | Performed by: SURGERY

## 2024-07-12 PROCEDURE — 83880 ASSAY OF NATRIURETIC PEPTIDE: CPT | Performed by: SURGERY

## 2024-07-12 PROCEDURE — 82550 ASSAY OF CK (CPK): CPT | Performed by: SURGERY

## 2024-07-12 PROCEDURE — 63600175 PHARM REV CODE 636 W HCPCS: Performed by: SURGERY

## 2024-07-12 PROCEDURE — 80307 DRUG TEST PRSMV CHEM ANLYZR: CPT | Performed by: SURGERY

## 2024-07-12 RX ORDER — CYCLOBENZAPRINE HCL 10 MG
10 TABLET ORAL 3 TIMES DAILY PRN
Qty: 10 TABLET | Refills: 0 | Status: SHIPPED | OUTPATIENT
Start: 2024-07-12 | End: 2024-07-17

## 2024-07-12 RX ORDER — KETOROLAC TROMETHAMINE 30 MG/ML
30 INJECTION, SOLUTION INTRAMUSCULAR; INTRAVENOUS
Status: COMPLETED | OUTPATIENT
Start: 2024-07-12 | End: 2024-07-12

## 2024-07-12 RX ORDER — IBUPROFEN 800 MG/1
800 TABLET ORAL EVERY 6 HOURS PRN
Qty: 20 TABLET | Refills: 0 | Status: SHIPPED | OUTPATIENT
Start: 2024-07-12

## 2024-07-12 RX ORDER — ASPIRIN 325 MG
325 TABLET ORAL
Status: COMPLETED | OUTPATIENT
Start: 2024-07-12 | End: 2024-07-12

## 2024-07-12 RX ADMIN — ASPIRIN 325 MG ORAL TABLET 325 MG: 325 PILL ORAL at 09:07

## 2024-07-12 RX ADMIN — KETOROLAC TROMETHAMINE 30 MG: 30 INJECTION, SOLUTION INTRAMUSCULAR; INTRAVENOUS at 10:07

## 2024-07-13 LAB
OHS QRS DURATION: 74 MS
OHS QTC CALCULATION: 431 MS

## 2024-07-13 NOTE — ED PROVIDER NOTES
Encounter Date: 7/12/2024       History     Chief Complaint   Patient presents with    Chest Pain     PT TO ER WITH C/O CHEST PAIN THAT BEGAN THIS MORNING. PT HAS A HISTORY OF AFIB.      History of Present Illness  Mary Mckeon is a 29 y.o. female that presents with chest pain this week  Patient was history significant for atrial fibrillation with the ablation in 2023  Patient was describes a pain in her left lower anterior rib below the breast  Patient states it hurts with a deep breath, patient was a smoker on history  Also states she has had minor cold symptoms this week, no CAD history  Normal sinus rhythm on EKG with no ST elevation on evaluation tonight    The history is provided by the patient and a parent.     Review of patient's allergies indicates:  No Known Allergies  Past Medical History:   Diagnosis Date    Anxiety disorder, unspecified     History of mononucleosis     Hypoglycemia     Migraine headache     Paroxysmal atrial fibrillation     Ablation March 2023    Posttraumatic stress disorder     Vitamin deficiency      Past Surgical History:   Procedure Laterality Date    ADENOIDECTOMY      TONSILLECTOMY      TYMPANOSTOMY TUBE PLACEMENT       Family History   Problem Relation Name Age of Onset    Migraines Mother      Sjogren's syndrome Father       Social History     Tobacco Use    Smoking status: Every Day     Current packs/day: 0.50     Types: Cigarettes    Smokeless tobacco: Never   Substance Use Topics    Alcohol use: No    Drug use: No     Review of Systems   Constitutional: Negative.    HENT: Negative.     Eyes: Negative.    Respiratory: Negative.     Cardiovascular:  Positive for chest pain.   Gastrointestinal: Negative.    Genitourinary: Negative.    Musculoskeletal: Negative.    Skin: Negative.    Neurological: Negative.    Psychiatric/Behavioral: Negative.         Physical Exam     Initial Vitals [07/12/24 2103]   BP Pulse Resp Temp SpO2   138/78 93 18 98.7 °F (37.1 °C) 98 %      MAP        --         Physical Exam    Nursing note and vitals reviewed.  Constitutional: Vital signs are normal. She appears well-developed and well-nourished. She is cooperative.   HENT:   Head: Normocephalic and atraumatic.   Eyes: Conjunctivae, EOM and lids are normal. Pupils are equal, round, and reactive to light.   Neck: Trachea normal and phonation normal. Neck supple. No JVD present.   Normal range of motion.   Full passive range of motion without pain.     Cardiovascular:  Normal rate, regular rhythm, S1 normal, S2 normal, normal heart sounds, intact distal pulses and normal pulses.           Pulmonary/Chest: Effort normal and breath sounds normal.   Abdominal: Abdomen is soft and flat. Bowel sounds are normal.   Musculoskeletal:         General: Normal range of motion.      Cervical back: Full passive range of motion without pain, normal range of motion and neck supple.     Neurological: She is alert and oriented to person, place, and time. She has normal strength.   Skin: Skin is warm, dry and intact. Capillary refill takes less than 2 seconds.         ED Course   Procedures  Labs Reviewed   CBC W/ AUTO DIFFERENTIAL - Abnormal; Notable for the following components:       Result Value    Lymph % 50.1 (*)     All other components within normal limits   URINALYSIS, REFLEX TO URINE CULTURE - Abnormal; Notable for the following components:    Ketones, UA Trace (*)     Occult Blood UA 1+ (*)     All other components within normal limits    Narrative:     Specimen Source->Urine   URINALYSIS MICROSCOPIC - Abnormal; Notable for the following components:    Yeast, UA Rare (*)     All other components within normal limits    Narrative:     Specimen Source->Urine   COMPREHENSIVE METABOLIC PANEL   TROPONIN I   CK   B-TYPE NATRIURETIC PEPTIDE   D DIMER, QUANTITATIVE   TSH   MAGNESIUM   PHOSPHORUS   PREGNANCY TEST, URINE RAPID    Narrative:     Specimen Source->Urine   DRUG SCREEN PANEL, URINE EMERGENCY    Narrative:      Specimen Source->Urine     EKG Readings: (Independently Interpreted)   No STEMI  Normal sinus rhythm  No ectopy  Normal conduction  Normal ST segments  Normal T-wave  Normal axis  Heart rate in the 80s       Imaging Results              X-Ray Chest 1 View (Final result)  Result time 07/12/24 22:20:53      Final result by Semaj Landon DO (07/12/24 22:20:53)                   Impression:      No acute abnormality.      Electronically signed by: Semaj Landon  Date:    07/12/2024  Time:    22:20               Narrative:    EXAMINATION:  XR CHEST 1 VIEW    CLINICAL HISTORY:  palpitations;    TECHNIQUE:  Single frontal view of the chest was performed.    COMPARISON:  12/29/2022.    FINDINGS:  The lungs are well expanded and clear. No focal opacities are seen. The pleural spaces are clear. The cardiac silhouette is unremarkable. The visualized osseous structures are unremarkable.                                       Medications   aspirin tablet 325 mg (325 mg Oral Given 7/12/24 2120)   ketorolac injection 30 mg (30 mg Intravenous Given 7/12/24 2225)     Medical Decision Making  Left chest wall pain on again off again for week on ER interview today  Differential includes angina, STEMI, non-STEMI, GERD, muscle pain, pleurisy    Problems Addressed:  Atypical chest pain: complicated acute illness or injury  Palpitations: complicated acute illness or injury    Amount and/or Complexity of Data Reviewed  Labs: ordered. Decision-making details documented in ED Course.  Radiology: ordered and independent interpretation performed.  ECG/medicine tests: ordered and independent interpretation performed.    ED Management & Risks of Complication, Morbidity, & Mortality:  Normal sinus rhythm on EKG with no ST elevation on ER evaluation  Chest x-ray within normal limits, normal troponin in the ED tonight  Normal lab work, (-) D-dimer, IV Toradol administered in the ER  Pain immediately controlled, pain worse with deep breaths on  HX  Appears to be pleuritic, heart score of 1, otherwise (-) workup  Motrin & Flexeril prescribed with recommended cardiology FU  Gave the patient the information for [facility] cardiologist Levar Gonzalez  Pt/Family counseled to return to the ER with any concerns on DC    Need for Hospitalization or Surgery with Social Determinants of Health:  This patient does not need to be hospitalized on ER evaluation today  The patient's diagnosis is not limited by social determinants of health  Does not require surgery or procedure (major or minor), no risk factors    Clinical Impression:  Final diagnoses:  [R00.2] Palpitations  [R07.89] Atypical chest pain (Primary)          ED Disposition Condition    Discharge Stable          ED Prescriptions       Medication Sig Dispense Start Date End Date Auth. Provider    ibuprofen (ADVIL,MOTRIN) 800 MG tablet Take 1 tablet (800 mg total) by mouth every 6 (six) hours as needed for Pain. 20 tablet 7/12/2024 -- Alber Fish MD    cyclobenzaprine (FLEXERIL) 10 MG tablet Take 1 tablet (10 mg total) by mouth 3 (three) times daily as needed for Muscle spasms. 10 tablet 7/12/2024 7/17/2024 Alber Fish MD          Follow-up Information       Follow up With Specialties Details Why Contact Info    Ryan Gonzalez MD Cardiology Go in 2 days  [address] [place] [address]  [phone]               Alber Fish MD  07/12/24 3207

## 2024-09-18 ENCOUNTER — HOSPITAL ENCOUNTER (EMERGENCY)
Facility: HOSPITAL | Age: 29
Discharge: HOME | End: 2024-09-18
Attending: STUDENT IN AN ORGANIZED HEALTH CARE EDUCATION/TRAINING PROGRAM

## 2024-09-18 VITALS
WEIGHT: 180 LBS | SYSTOLIC BLOOD PRESSURE: 124 MMHG | DIASTOLIC BLOOD PRESSURE: 73 MMHG | BODY MASS INDEX: 36.36 KG/M2 | HEART RATE: 83 BPM | TEMPERATURE: 98 F | OXYGEN SATURATION: 99 % | RESPIRATION RATE: 20 BRPM

## 2024-09-18 DIAGNOSIS — M54.2 NECK PAIN: Primary | ICD-10-CM

## 2024-09-18 PROCEDURE — 25000003 PHARM REV CODE 250: Performed by: STUDENT IN AN ORGANIZED HEALTH CARE EDUCATION/TRAINING PROGRAM

## 2024-09-18 PROCEDURE — 96372 THER/PROPH/DIAG INJ SC/IM: CPT | Performed by: STUDENT IN AN ORGANIZED HEALTH CARE EDUCATION/TRAINING PROGRAM

## 2024-09-18 PROCEDURE — 99284 EMERGENCY DEPT VISIT MOD MDM: CPT | Mod: 25

## 2024-09-18 PROCEDURE — 63600175 PHARM REV CODE 636 W HCPCS: Performed by: STUDENT IN AN ORGANIZED HEALTH CARE EDUCATION/TRAINING PROGRAM

## 2024-09-18 RX ORDER — CYCLOBENZAPRINE HCL 10 MG
10 TABLET ORAL 3 TIMES DAILY PRN
Qty: 15 TABLET | Refills: 0 | Status: SHIPPED | OUTPATIENT
Start: 2024-09-18 | End: 2024-09-23

## 2024-09-18 RX ORDER — KETOROLAC TROMETHAMINE 30 MG/ML
15 INJECTION, SOLUTION INTRAMUSCULAR; INTRAVENOUS
Status: COMPLETED | OUTPATIENT
Start: 2024-09-18 | End: 2024-09-18

## 2024-09-18 RX ORDER — LIDOCAINE 50 MG/G
1 PATCH TOPICAL DAILY
Qty: 10 PATCH | Refills: 0 | Status: SHIPPED | OUTPATIENT
Start: 2024-09-18

## 2024-09-18 RX ORDER — IBUPROFEN 600 MG/1
600 TABLET ORAL EVERY 6 HOURS PRN
Qty: 20 TABLET | Refills: 0 | Status: SHIPPED | OUTPATIENT
Start: 2024-09-18

## 2024-09-18 RX ORDER — LIDOCAINE HYDROCHLORIDE 10 MG/ML
5 INJECTION, SOLUTION EPIDURAL; INFILTRATION; INTRACAUDAL; PERINEURAL
Status: COMPLETED | OUTPATIENT
Start: 2024-09-18 | End: 2024-09-18

## 2024-09-18 RX ORDER — CYCLOBENZAPRINE HCL 10 MG
10 TABLET ORAL
Status: COMPLETED | OUTPATIENT
Start: 2024-09-18 | End: 2024-09-18

## 2024-09-18 RX ADMIN — KETOROLAC TROMETHAMINE 15 MG: 30 INJECTION, SOLUTION INTRAMUSCULAR; INTRAVENOUS at 09:09

## 2024-09-18 RX ADMIN — LIDOCAINE HYDROCHLORIDE 50 MG: 10 INJECTION, SOLUTION EPIDURAL; INFILTRATION; INTRACAUDAL; PERINEURAL at 09:09

## 2024-09-18 RX ADMIN — CYCLOBENZAPRINE HYDROCHLORIDE 10 MG: 10 TABLET, FILM COATED ORAL at 09:09

## 2024-09-18 NOTE — DISCHARGE INSTRUCTIONS
Please follow up with your primary care physician within 2 days. Ensure that you review all lab work results and/or imaging results. If you have any questions about your discharge paperwork please call the Emergency Department.     If you were prescribed antibiotics, please take them to completion. If you were prescribed a narcotic or any sedating medication, do not drive or operate heavy equipment or machinery while taking these medications.  If you were diagnosed with a seizure, syncope, any loss of consciousness or decreased alertness, do not drive, swim, operate heavy machinery, or put yourself in any position where a sudden loss of consciousness could put yourself or others in danger.    Thank you for visiting [facility], Department of Emergency Medicine. Please see the entirety of the educational materials provided. Please note that a visit to the emergency department does not substitute ongoing care from a primary medical provider or specialist. Please ensure to follow up as recommended. However, please return to the emergency department immediately if symptoms do not improve as discussed, symptoms worsen, new symptoms develop, difficulty in following up or for any of your concerns or issues. Please note on discharge you are acknowledging understanding and agreement on medical evaluation, management recommendations and follow up recommendations.

## 2024-09-18 NOTE — ED PROVIDER NOTES
Encounter Date: 9/18/2024       History     Chief Complaint   Patient presents with    Neck Pain     29-year-old female with history of migraines, AFib, presenting with right-sided neck pain for the last four days.  Feels like a muscle tightness.  No numbness or weakness.  No facial droop.  Pain with palpation.  No fever or headache.      Review of patient's allergies indicates:  No Known Allergies  Past Medical History:   Diagnosis Date    Anxiety disorder, unspecified     History of mononucleosis     Hypoglycemia     Migraine headache     Paroxysmal atrial fibrillation     Ablation March 2023    Posttraumatic stress disorder     Vitamin deficiency      Past Surgical History:   Procedure Laterality Date    ADENOIDECTOMY      TONSILLECTOMY      TYMPANOSTOMY TUBE PLACEMENT       Family History   Problem Relation Name Age of Onset    Migraines Mother      Sjogren's syndrome Father       Social History     Tobacco Use    Smoking status: Every Day     Current packs/day: 0.50     Types: Cigarettes    Smokeless tobacco: Never   Substance Use Topics    Alcohol use: No    Drug use: No     Review of Systems   Constitutional:  Negative for fever.   HENT:  Negative for sore throat.    Respiratory:  Negative for shortness of breath.    Cardiovascular:  Negative for chest pain.   Gastrointestinal:  Negative for nausea.   Genitourinary:  Negative for dysuria.   Musculoskeletal:  Positive for neck pain. Negative for back pain.   Skin:  Negative for rash.   Neurological:  Negative for weakness.   Hematological:  Does not bruise/bleed easily.       Physical Exam     Initial Vitals [09/18/24 0933]   BP Pulse Resp Temp SpO2   124/73 83 20 98 °F (36.7 °C) 99 %      MAP       --         Physical Exam    Nursing note and vitals reviewed.  Constitutional: She appears well-developed.   HENT:   Head: Normocephalic.   Eyes: Pupils are equal, round, and reactive to light.   Neck:   Normal range of motion.  Cardiovascular:            No murmur  heard.  Pulmonary/Chest: No respiratory distress.   Abdominal: Abdomen is soft.   Musculoskeletal:         General: No edema.      Cervical back: Normal range of motion.      Comments: Tenderness to palpation to right side paraspinal muscles, as well as right trapezius muscle.  No skin changes.     Neurological: She is alert.   Skin: Skin is warm.   Psychiatric: She has a normal mood and affect.         ED Course   Procedures  Labs Reviewed - No data to display       Imaging Results    None          Medications   ketorolac injection 15 mg (15 mg Intramuscular Given 9/18/24 0946)   cyclobenzaprine tablet 10 mg (10 mg Oral Given 9/18/24 0946)   LIDOcaine (PF) 10 mg/ml (1%) injection 50 mg (50 mg Infiltration Given 9/18/24 0946)     Medical Decision Making  DDX:  Muscle spasm to right paraspinal and right trapezius muscle.  Will perform systemic and local interventions.  Do not suspect vertebral artery dissection, meningitis, given lack of neurologic symptoms, headache, fever.  DX:  None  TX:  Toradol, Flexeril, trigger point injections  Dispo:  Discharge with PCP follow-up.        Risk  Prescription drug management.               ED Course as of 09/18/24 1002   Wed Sep 18, 2024   1002 I spoke with patient about the risk of going ahead with workup including medications that could harm a fetus, and imaging that involves a risk of radiation if the patient is pregnant.  Patient reports that they do not believe they are pregnant, and would like to forego the pregnancy test prior to workup.  They understand that there is a chance that this may lead to a missed pregnancy and that there may be unintentional teratogenic effects and/or radiation exposure, and patient accepts this responsibility.     [NB]      ED Course User Index  [NB] Sandro Hector MD                           Clinical Impression:  Final diagnoses:  [M54.2] Neck pain (Primary)          ED Disposition Condition    Discharge Stable          ED  Prescriptions       Medication Sig Dispense Start Date End Date Auth. Provider    cyclobenzaprine (FLEXERIL) 10 MG tablet Take 1 tablet (10 mg total) by mouth 3 (three) times daily as needed for Muscle spasms. 15 tablet 9/18/2024 9/23/2024 Sandro Hector MD    ibuprofen (ADVIL,MOTRIN) 600 MG tablet Take 1 tablet (600 mg total) by mouth every 6 (six) hours as needed for Pain. 20 tablet 9/18/2024 -- Sandro Hector MD    LIDOcaine (LIDODERM) 5 % Place 1 patch onto the skin once daily. Remove & Discard patch within 12 hours or as directed by MD 10 patch 9/18/2024 -- Sandro Hector MD          Follow-up Information       Follow up With Specialties Details Why Contact Info    Symone Prince MD Family Medicine Schedule an appointment as soon as possible for a visit in 2 days  [id number]   [place] [id number] [phone]      [facility] - Emergency Dept Emergency Medicine  If symptoms worsen [address] [place] [id number]  [phone]             Sandro Hector MD  09/18/24 0939       Sandro Hector MD  09/18/24 1002

## 2024-09-18 NOTE — ED TRIAGE NOTES
29 y.o. female presents to ER Room/bed info not found   Chief Complaint   Patient presents with    Neck Pain   .   C/o neck pain/stiffness and right arm pain for four days

## 2024-09-18 NOTE — Clinical Note
"Mary Faustin" Mike was seen and treated in our emergency department on 9/18/2024.  She may return to work on 09/19/2024.       If you have any questions or concerns, please don't hesitate to call.       RN    "

## 2025-04-07 ENCOUNTER — PATIENT MESSAGE (OUTPATIENT)
Dept: CARDIOLOGY | Facility: CLINIC | Age: 30
End: 2025-04-07
Payer: COMMERCIAL

## 2025-04-07 ENCOUNTER — TELEPHONE (OUTPATIENT)
Dept: CARDIOLOGY | Facility: CLINIC | Age: 30
End: 2025-04-07
Payer: COMMERCIAL

## 2025-04-07 NOTE — TELEPHONE ENCOUNTER
Pt is calling stating that [facility] was suppose to fax Cardio referral.  Can you please check on this and contact pt to schedule.    Pt's Contact # [phone]

## 2025-04-09 ENCOUNTER — PATIENT MESSAGE (OUTPATIENT)
Dept: CARDIOLOGY | Facility: CLINIC | Age: 30
End: 2025-04-09
Payer: COMMERCIAL

## 2025-04-10 ENCOUNTER — OFFICE VISIT (OUTPATIENT)
Dept: CARDIOLOGY | Facility: CLINIC | Age: 30
End: 2025-04-10
Payer: COMMERCIAL

## 2025-04-10 VITALS
OXYGEN SATURATION: 97 % | DIASTOLIC BLOOD PRESSURE: 86 MMHG | HEART RATE: 94 BPM | BODY MASS INDEX: 38.88 KG/M2 | WEIGHT: 192.88 LBS | SYSTOLIC BLOOD PRESSURE: 134 MMHG | HEIGHT: 59 IN

## 2025-04-10 DIAGNOSIS — E66.09 CLASS 2 OBESITY DUE TO EXCESS CALORIES WITHOUT SERIOUS COMORBIDITY WITH BODY MASS INDEX (BMI) OF 38.0 TO 38.9 IN ADULT: ICD-10-CM

## 2025-04-10 DIAGNOSIS — E66.812 CLASS 2 OBESITY DUE TO EXCESS CALORIES WITHOUT SERIOUS COMORBIDITY WITH BODY MASS INDEX (BMI) OF 38.0 TO 38.9 IN ADULT: ICD-10-CM

## 2025-04-10 DIAGNOSIS — R00.2 PALPITATIONS: ICD-10-CM

## 2025-04-10 DIAGNOSIS — I48.0 PAF (PAROXYSMAL ATRIAL FIBRILLATION): Primary | ICD-10-CM

## 2025-04-10 DIAGNOSIS — R07.89 OTHER CHEST PAIN: ICD-10-CM

## 2025-04-10 DIAGNOSIS — R06.02 SOBOE (SHORTNESS OF BREATH ON EXERTION): ICD-10-CM

## 2025-04-10 DIAGNOSIS — R55 SYNCOPE AND COLLAPSE: ICD-10-CM

## 2025-04-10 DIAGNOSIS — R42 POSITIONAL LIGHTHEADEDNESS: ICD-10-CM

## 2025-04-10 DIAGNOSIS — Z72.0 TOBACCO ABUSE: ICD-10-CM

## 2025-04-10 DIAGNOSIS — Z91.89 AT RISK FOR SLEEP APNEA: ICD-10-CM

## 2025-04-10 PROCEDURE — 3079F DIAST BP 80-89 MM HG: CPT | Mod: CPTII,S$GLB,,

## 2025-04-10 PROCEDURE — 1159F MED LIST DOCD IN RCRD: CPT | Mod: CPTII,S$GLB,,

## 2025-04-10 PROCEDURE — 1160F RVW MEDS BY RX/DR IN RCRD: CPT | Mod: CPTII,S$GLB,,

## 2025-04-10 PROCEDURE — 3008F BODY MASS INDEX DOCD: CPT | Mod: CPTII,S$GLB,,

## 2025-04-10 PROCEDURE — 99999 PR PBB SHADOW E&M-EST. PATIENT-LVL III: CPT | Mod: PBBFAC,,,

## 2025-04-10 PROCEDURE — 99204 OFFICE O/P NEW MOD 45 MIN: CPT | Mod: S$GLB,,,

## 2025-04-10 PROCEDURE — 3075F SYST BP GE 130 - 139MM HG: CPT | Mod: CPTII,S$GLB,,

## 2025-04-10 NOTE — PROGRESS NOTES
"Subjective:    Patient ID:  Mary Mckeon is a 30 y.o. female who presents for evaluation of No chief complaint on file.      PCP: Symone Prince MD     Referring Provider:     HPI: Patient is a 30 to F w/Hx of PAF, S/P  AF [place] on 3/22/2023 ([facility] [place] tobacco abuse (8 yr Hx, 1 pack/3 days), obesity,  anxiety who present today to establish care.  She was previously followed by Cardiology at [facility] [place] and was last seen by Dr. Cameron Li on 4/20/2023 s/p successful  RFA 2/2 A-fib.    Patient reports dizziness and lightheadedness with position changes. Patient also notes SOB/CONN with ambulating 25 feet. Patient also reports episodes of palpitations. Patient reports syncopal episode in October 2024 w changing from sitting to standing. Patient also notes left sided CP associated with dizziness and left arm numbness.patient also states she was told she " breaths really heavy in her sleep."   Patient denies orthopnea, PND, presyncope, LOC, swelling, or claudication. Patient is currently not taking any prescribed medication. Patient does not exercise regularly but remains active with walking at work. .    Past Medical History:   Diagnosis Date    Anxiety disorder, unspecified     History of mononucleosis     Hypoglycemia     Migraine headache     Paroxysmal atrial fibrillation     Ablation March 2023    Posttraumatic stress disorder     Vitamin deficiency      Past Surgical History:   Procedure Laterality Date    ADENOIDECTOMY      TONSILLECTOMY      TYMPANOSTOMY TUBE PLACEMENT       Social History[1]  Family History   Problem Relation Name Age of Onset    Migraines Mother      Sjogren's syndrome Father         Review of patient's allergies indicates:  No Known Allergies    Medication List with Changes/Refills   Discontinued Medications    ACETAMINOPHEN (TYLENOL) 325 MG TABLET    Take 325 mg by mouth every 6 (six) hours as needed for Pain.    IBUPROFEN (ADVIL,MOTRIN) 600 MG TABLET    Take 1 tablet (600 mg " "total) by mouth every 6 (six) hours as needed for Pain.    IBUPROFEN (ADVIL,MOTRIN) 800 MG TABLET    Take 1 tablet (800 mg total) by mouth every 6 (six) hours as needed for Pain.    LIDOCAINE (LIDODERM) 5 %    Place 1 patch onto the skin once daily. Remove & Discard patch within 12 hours or as directed by MD    NAPROXEN (NAPROSYN) 500 MG TABLET    Take 1 tablet (500 mg total) by mouth 2 (two) times daily with meals.       Review of Systems   Constitutional: Negative for diaphoresis and fever.   HENT:  Negative for congestion and hearing loss.    Eyes:  Negative for blurred vision and pain.   Cardiovascular:  Positive for chest pain, palpitations and syncope. Negative for claudication, dyspnea on exertion, leg swelling and near-syncope.   Respiratory:  Positive for shortness of breath. Negative for sleep disturbances due to breathing.    Hematologic/Lymphatic: Negative for bleeding problem. Does not bruise/bleed easily.   Skin:  Negative for color change and poor wound healing.   Gastrointestinal:  Negative for abdominal pain and nausea.   Genitourinary:  Negative for bladder incontinence and flank pain.   Neurological:  Positive for dizziness, light-headedness, numbness and paresthesias. Negative for focal weakness.        Objective:   /86 (BP Location: Left arm, Patient Position: Sitting)   Pulse 94   Ht 4' 11" (1.499 m)   Wt 87.5 kg (192 lb 14.4 oz)   LMP 03/29/2025   SpO2 97%   BMI 38.96 kg/m²    Physical Exam  Constitutional:       Appearance: She is well-developed. She is not diaphoretic.   HENT:      Head: Normocephalic and atraumatic.   Eyes:      General: No scleral icterus.     Pupils: Pupils are equal, round, and reactive to light.   Neck:      Vascular: No JVD.   Cardiovascular:      Rate and Rhythm: Normal rate and regular rhythm.      Pulses: Intact distal pulses.           Radial pulses are 2+ on the right side and 2+ on the left side.        Dorsalis pedis pulses are 2+ on the right side " and 2+ on the left side.        Posterior tibial pulses are 2+ on the right side and 2+ on the left side.      Heart sounds: S1 normal and S2 normal. No murmur heard.     No friction rub. No gallop.   Pulmonary:      Effort: Pulmonary effort is normal. No respiratory distress.      Breath sounds: Normal breath sounds. No wheezing or rales.   Chest:      Chest wall: No tenderness.   Abdominal:      General: Bowel sounds are normal. There is no distension.      Palpations: Abdomen is soft. There is no mass.      Tenderness: There is no abdominal tenderness. There is no rebound.   Musculoskeletal:         General: No tenderness. Normal range of motion.      Cervical back: Normal range of motion and neck supple.   Skin:     General: Skin is warm and dry.      Coloration: Skin is not pale.   Neurological:      Mental Status: She is alert and oriented to person, place, and time.      Coordination: Coordination normal.      Deep Tendon Reflexes: Reflexes normal.   Psychiatric:         Behavior: Behavior normal.         Judgment: Judgment normal.           CTA Chest PE study 4/11/2023 ([facility] [place]  FINDINGS:     GREAT ARTERIES: Normal.   GREAT VEINS: Normal.   SPINAL CANAL: Normal.   BODY WALL & AXILLAE: Normal.   THORACIC INLET: Normal.   MEDIASTINUM: Small, scattered mediastinal lymph nodes. No change in the diffuse, fat-containing anterior mediastinal soft tissue.   HEART, PERICARDIUM, & CORONARY VESSELS: Normal.   MALLORY: Normal.   AIRWAYS: Normal.   LUNGS: Dependent groundglass, likely atelectasis.   PLEURA & PLEURAL SPACE: Normal.   DIAPHRAGM: Normal.   UPPER ABDOMEN: Normal.   MUSCULOSKELETAL SYSTEM: Normal.     IMPRESSION:   No pulmonary embolism. No evidence of cardiopulmonary disease.        Electrophysiology Study 3/23/2023 ([facility] [place])    Narrative  Performed by [facility] [employer]  This result has an attachment that is not available.  Conclusion:  1. Successful first-pass isolation of all pulmonary veins for  paroxysmal  AF.  2. Dual megan physiology without induction of SVT.  3. No acute complications.    CTA Chest PE study 1/20/2023 ([facility] [place])  FINDINGS:   No filling defects are seen within the pulmonary vascular bed due to indicate pulmonary embolus. The pulmonary artery is normal in caliber.     The central airways are patent. There is no pneumothorax.     The lungs are clear without airspace consolidation, nodularity, or mass. There is mild dependent atelectasis bilaterally.     There is no pleural effusion.     The heart is normal in size. There is no pericardial effusion.   The aorta and great vessels are normal.     There is no lymphadenopathy.   The thyroid [has a normal CT appearance.]     Images of the upper abdomen appear normal.     No aggressive appearing lytic/blastic lesions.       IMPRESSION:   No evidence of central pulmonary thromboembolism.   No acute intrathoracic abnormality.   Mild dependent atelectasis.   Cardiac echo 1/19/2023 ([facility] [place])   PHYSICIAN INTERPRETATION by Edin Cho MD:     RHYTHM: Sinus rhythm. The average ventricular rate was 95 bpm.   LEFT VENTRICLE: Normal left ventricular cavity size. Global left ventricular systolic function is normal. Left ventricular ejection fraction is greater than 55%. The left ventricular diastolic function is normal. Normal mean global longitudinal strain value of -16.7 % by speckle tracking method.   LV HEMODYNAMICS: The calculated cardiac output is 4.41 l/min. The LV stroke volume index is 26.1 ml/m². Borderline increased estimated systemic vascular resistance of 1451 dynes·s/cm5. Borderline decreased estimated cardiac power output of 0.83 Gant.   RIGHT VENTRICLE: The right ventricular size is normal. Right ventricular wall thickness is normal. Overall RV systolic function is normal. Normal tricuspid annular plane systolic excursion (TAPSE), measuring 2.1 cm.   LEFT ATRIUM: The left atrium was not well visualized. The left atrium is small in size, with  a left atrial volume of 12.6 ml/m². The left atrium is normal.   RIGHT ATRIUM: The right atrium was not well visualized. The right atrial size is normal, measuring 24.3 mL/m². The right atrium is normal qualitatively. Estimated right atrial pressure is 5 mmHg.   AORTIC VALVE: The native aortic valve is trileaflet. Normal estimated aortic valve area. No evidence of aortic sclerosis or stenosis. No aortic valve regurgitation is seen.   MITRAL VALVE: The mitral valve appears normal. No evidence of mitral valve stenosis. No evidence of mitral valve regurgitation.   TRICUSPID VALVE: Normal tricuspid valve leaflets. No tricuspid regurgitation is present.   PULMONIC VALVE: No indication of pulmonic regurgitation.   AORTA: The visualized portions of the aortic root, abdominal aorta, descending thoracic aorta and aortic arch are normal. The largest aortic root diameter in diastole measures 2.3 cm.   VENA CAVAE: The inferior vena cava is normal in size, measuring 1.6 cm in diameter. The IVC demonstrates greater than 50% decrease in size with respiration.   PERICARDIUM: No pericardial effusion.   IN COMPARISON TO THE PREVIOUS ECHOCARDIOGRAM(S): There are no prior studies on this patient for comparison purposes.     SUMMARY:    1. Normal left ventricular systolic function. LVEF of > 55%.    2. Normal left ventricular strain (-16.7 %).    3. LV diastolic function is normal.    4. Normal right ventricular systolic function.    5. Normal central venous pressure.     Assessment:       1. PAF (paroxysmal atrial fibrillation)    2. Tobacco abuse    3. Other chest pain    4. SOBOE (shortness of breath on exertion)    5. Palpitations    6. Syncope and collapse    7. Positional lightheadedness    8. At risk for sleep apnea    9. Class 2 obesity due to excess calories without serious comorbidity with body mass index (BMI) of 38.0 to 38.9 in adult         Plan:         PAF (paroxysmal atrial fibrillation)  -S/P RFA 3/22/2023 [facility] [place]    Other  chest pain  -Exercise echo stress test to evaluate for ischemia     SOBOE (shortness of breath on exertion)  -cardiac echo to evaluate heart function     Palpitations  -EKG in office reviewed NSR- HR 99   -cardiac event monitor to evaluate for arrhthymias     Syncope and collapse  -Cardiac event monitor to evaluate for arrhythmias     Positional lightheadedness  -cardiac echo to evaluate heart function  -cardiac event monitor to evaluate for arrhythmias     Tobacco abuse   tobacco abuse (8 yr Hx, 1 pack/3 days)   The patient was counseled on the dangers of tobacco use, and was advised to quit.  Reviewed strategies to maximize success, including removing cigarettes and smoking materials from environment.  -Patient has cut back and in the process of quitting     At risk for sleep apnea  -Refer to Sleep medicine to evaluate for sleep apnea     Class 2 obesity due to excess calories without serious comorbidity with body mass index (BMI) of 38.0 to 38.9 in adult  Body mass index is 38.96 kg/m². Morbid obesity complicates all aspects of disease management from diagnostic modalities to treatment. Weight loss encouraged and health benefits explained to patient.         Total duration of face to face visit time 30 minutes.  Total time spent counseling greater than fifty percent of total visit time.  Counseling included discussion regarding imaging findings, diagnosis, possibilities, treatment options, risks and benefits.  The patient had many questions regarding the options and long-term effects      Colten Fuentes, EPIFANIO  Cardiology-[facility]                  [1]   Social History  Socioeconomic History    Marital status: Significant Other   Tobacco Use    Smoking status: Every Day     Current packs/day: 0.50     Types: Cigarettes    Smokeless tobacco: Never   Substance and Sexual Activity    Alcohol use: No    Drug use: No   Social History Narrative    ** Merged History Encounter **

## 2025-04-10 NOTE — ASSESSMENT & PLAN NOTE
tobacco abuse (8 yr Hx, 1 pack/3 days)   The patient was counseled on the dangers of tobacco use, and was advised to quit.  Reviewed strategies to maximize success, including removing cigarettes and smoking materials from environment.  -Patient has cut back and in the process of quitting

## 2025-04-10 NOTE — ASSESSMENT & PLAN NOTE
Body mass index is 38.96 kg/m². Morbid obesity complicates all aspects of disease management from diagnostic modalities to treatment. Weight loss encouraged and health benefits explained to patient.

## 2025-04-24 ENCOUNTER — TELEPHONE (OUTPATIENT)
Dept: CARDIOLOGY | Facility: CLINIC | Age: 30
End: 2025-04-24
Payer: COMMERCIAL

## 2025-04-24 NOTE — TELEPHONE ENCOUNTER
Re explained to the patient on how the cardiac event monitor works.  Thank you,    Queenie Maddox  Medical Assistant   Psychiatric   [phone]-Phone  [phone]-Fax        ----- Message from Lizbeth sent at 4/24/2025  3:09 PM CDT -----  Type: General Call Back Name of Caller:PtSymptoms:montiorWould the patient rather a call back or a response via [facility]? Call [facility] Call Back Number:[phone] Additional Information: Pt was advised that she would be receiving a monitor and she hasn't heard anything regarding this. She wants to know if it being mailed in or does she need to do something. Please call with further

## 2025-05-01 ENCOUNTER — TELEPHONE (OUTPATIENT)
Dept: CARDIOLOGY | Facility: CLINIC | Age: 30
End: 2025-05-01
Payer: COMMERCIAL

## 2025-05-01 NOTE — TELEPHONE ENCOUNTER
Spoke with Ms. Hernandez regarding her echo stress. I advised her that they denied that one, but not her echo. I rescheduled it for her.   Thank you,    Queenie Maddox  Medical Assistant   [facility]   [phone]-Phone  [phone]-Fax      ----- Message from Selina sent at 5/1/2025  8:20 AM CDT -----  Contact: Shun  Type:  Patient CallWho Called:Patient Does the patient know what this is regarding?: Requesting a call back patient said that she received a call saying that her insurance denied her scheduled test ; pt would like to know if she would have a appeal ; please advise Would the patient rather a call back or a response via [facility]?call [facility] Call Back Number: [phone] Additional Information:

## 2025-05-27 ENCOUNTER — CLINICAL SUPPORT (OUTPATIENT)
Dept: CARDIOLOGY | Facility: HOSPITAL | Age: 30
End: 2025-05-27
Payer: COMMERCIAL

## 2025-05-27 DIAGNOSIS — R55 SYNCOPE AND COLLAPSE: ICD-10-CM

## 2025-06-27 ENCOUNTER — RESULTS FOLLOW-UP (OUTPATIENT)
Dept: CARDIOLOGY | Facility: CLINIC | Age: 30
End: 2025-06-27

## 2025-06-30 ENCOUNTER — TELEPHONE (OUTPATIENT)
Dept: CARDIOLOGY | Facility: CLINIC | Age: 30
End: 2025-06-30
Payer: COMMERCIAL

## 2025-06-30 NOTE — TELEPHONE ENCOUNTER
I reached out to lucille Hernandez and informed er of her results & She expressed understanding of the results.    Queenie Maddox  Medical Assistant  [facility]  [phone] - Office  [phone] - Fax          ----- Message from Colten Fuentes NP sent at 6/27/2025  4:02 PM CDT -----  Please inform, Ms. Mckeon the heart function is within normal limits. No change in treatment is required. Please contact office with questions or concerns.     Thank you,  Colten Fuentes DNP   ----- Message -----  From: Braydon Rutherford MD  Sent: 6/26/2025   9:15 PM CDT  To: Colten Fuentes NP

## 2025-07-03 ENCOUNTER — TELEPHONE (OUTPATIENT)
Dept: CARDIOLOGY | Facility: CLINIC | Age: 30
End: 2025-07-03
Payer: COMMERCIAL

## 2025-07-03 NOTE — TELEPHONE ENCOUNTER
Spoke with patient and informed her per Colten Fuentes NP the heart monitor was negative for arrhythmias.  ----- Message from Colten Fuentes NP sent at 7/3/2025  4:25 PM CDT -----  Please inform, Ms. Mckeon the heart monitor was negative for arrhythmias. No additional testing is required.    Thank you,  Colten Fuentes DNP   ----- Message -----  From: Fili Branham MD  Sent: 7/3/2025  11:19 AM CDT  To: Colten Fuentes NP